# Patient Record
Sex: FEMALE | Race: WHITE | URBAN - METROPOLITAN AREA
[De-identification: names, ages, dates, MRNs, and addresses within clinical notes are randomized per-mention and may not be internally consistent; named-entity substitution may affect disease eponyms.]

---

## 2018-05-14 ENCOUNTER — APPOINTMENT (RX ONLY)
Dept: URBAN - METROPOLITAN AREA CLINIC 22 | Facility: CLINIC | Age: 33
Setting detail: DERMATOLOGY
End: 2018-05-14

## 2018-05-14 DIAGNOSIS — Z41.1 ENCOUNTER FOR COSMETIC SURGERY: ICD-10-CM

## 2018-05-14 PROBLEM — F41.9 ANXIETY DISORDER, UNSPECIFIED: Status: ACTIVE | Noted: 2018-05-14

## 2018-05-14 PROCEDURE — ? PHOTOS OBTAINED

## 2018-05-14 PROCEDURE — 99003: CPT

## 2018-05-14 PROCEDURE — ? CONSULTATION - RHINOPLASTY

## 2018-05-14 ASSESSMENT — LOCATION DETAILED DESCRIPTION DERM: LOCATION DETAILED: NASAL DORSUM

## 2018-05-14 ASSESSMENT — LOCATION SIMPLE DESCRIPTION DERM: LOCATION SIMPLE: NOSE

## 2018-05-14 ASSESSMENT — LOCATION ZONE DERM: LOCATION ZONE: NOSE

## 2018-05-14 NOTE — PROCEDURE: PHOTOS OBTAINED
Photographed Locations: Photos were obtained.
Follow-Up For Additional Photos?: no
Detail Level: Simple
Follow-Up Interval: week

## 2018-05-14 NOTE — HPI: NOSE (AESTHETIC DEFORMITY, NOSE)
Which Direction?: convex to the left side
How Severe Is It?: moderate
Is This A New Presentation, Or A Follow-Up?: Nasal Aesthetic Deformity
Additional History: Patient has history of septoplasty 12 years ago following a volleyball injury. Patient with concerns about the curvature of her nose. Patient denies breathing difficulty.

## 2018-05-22 ENCOUNTER — APPOINTMENT (RX ONLY)
Dept: URBAN - METROPOLITAN AREA CLINIC 22 | Facility: CLINIC | Age: 33
Setting detail: DERMATOLOGY
End: 2018-05-22

## 2018-05-22 DIAGNOSIS — Z41.1 ENCOUNTER FOR COSMETIC SURGERY: ICD-10-CM

## 2018-05-22 PROCEDURE — 99005: CPT

## 2018-05-22 PROCEDURE — ? CONSULTATION - RHINOPLASTY

## 2018-05-22 ASSESSMENT — LOCATION ZONE DERM: LOCATION ZONE: NOSE

## 2018-05-22 ASSESSMENT — LOCATION DETAILED DESCRIPTION DERM: LOCATION DETAILED: NASAL DORSUM

## 2018-05-22 ASSESSMENT — LOCATION SIMPLE DESCRIPTION DERM: LOCATION SIMPLE: NOSE

## 2018-06-11 ENCOUNTER — APPOINTMENT (RX ONLY)
Dept: URBAN - METROPOLITAN AREA CLINIC 22 | Facility: CLINIC | Age: 33
Setting detail: DERMATOLOGY
End: 2018-06-11

## 2018-06-11 DIAGNOSIS — Z41.9 ENCOUNTER FOR PROCEDURE FOR PURPOSES OTHER THAN REMEDYING HEALTH STATE, UNSPECIFIED: ICD-10-CM

## 2018-06-11 PROCEDURE — 99004: CPT

## 2018-06-11 PROCEDURE — ? PRE-OP WORKLIST

## 2018-06-11 PROCEDURE — ? PRESCRIPTION

## 2018-06-11 RX ORDER — DOXYCYCLINE HYCLATE 100 MG/1
TABLET, COATED ORAL
Qty: 14 | Refills: 0 | Status: ERX | COMMUNITY
Start: 2018-06-11

## 2018-06-11 RX ORDER — ONDANSETRON HYDROCHLORIDE 4 MG/1
TABLET, FILM COATED ORAL
Qty: 15 | Refills: 0 | Status: ERX | COMMUNITY
Start: 2018-06-11

## 2018-06-11 RX ORDER — METHYLPREDNISOLONE 4 MG/1
TABLET ORAL
Qty: 1 | Refills: 0 | Status: ERX | COMMUNITY
Start: 2018-06-11

## 2018-06-11 RX ORDER — OXYCODONE HYDROCHLORIDE AND ACETAMINOPHEN 5; 325 MG/1; MG/1
TABLET ORAL
Qty: 40 | Refills: 0 | COMMUNITY
Start: 2018-06-11

## 2018-06-11 RX ADMIN — DOXYCYCLINE HYCLATE: 100 TABLET, COATED ORAL at 15:09

## 2018-06-11 RX ADMIN — METHYLPREDNISOLONE: 4 TABLET ORAL at 15:09

## 2018-06-11 RX ADMIN — OXYCODONE HYDROCHLORIDE AND ACETAMINOPHEN: 5; 325 TABLET ORAL at 15:09

## 2018-06-11 RX ADMIN — ONDANSETRON HYDROCHLORIDE: 4 TABLET, FILM COATED ORAL at 15:09

## 2018-06-11 ASSESSMENT — LOCATION SIMPLE DESCRIPTION DERM: LOCATION SIMPLE: NOSE

## 2018-06-11 ASSESSMENT — LOCATION DETAILED DESCRIPTION DERM: LOCATION DETAILED: NASAL DORSUM

## 2018-06-11 ASSESSMENT — LOCATION ZONE DERM: LOCATION ZONE: NOSE

## 2018-06-11 NOTE — HPI: PREOPERATIVE APPOINTMENT (BRIEF)
Has The Patient Completed Informed Consent?: is scheduled to complete informed consent documentation during this visit
Has The Patient Fulfilled Financial Responsibilities For Surgical Scheduling?: is scheduled to complete payment to fulfill financial responsibilities during this visit
Have Arrangements And Instructions Been Made For Patient Transportation?: Transportation arrangements have been made
Date Of Procedure?: 06/18/2018
Facility: [facility]
Surgeon: Dr. Plaza

## 2018-06-11 NOTE — PROCEDURE: PRE-OP WORKLIST
Admission Status: outpatient
Date Of Surgery: 06/18/2018
Photos Taken?: yes
Surgeon: Dr. Plaza
Surgery Scheduled: rhinoplasty
Detail Level: Simple

## 2018-06-18 ENCOUNTER — APPOINTMENT (RX ONLY)
Dept: URBAN - METROPOLITAN AREA SURGERY CENTER 5 | Facility: SURGERY CENTER | Age: 33
Setting detail: DERMATOLOGY
End: 2018-06-18

## 2018-06-18 DIAGNOSIS — Z41.1 ENCOUNTER FOR COSMETIC SURGERY: ICD-10-CM

## 2018-06-18 PROCEDURE — ? SET GLOBAL PERIOD

## 2018-06-18 PROCEDURE — 30410 RECONSTRUCTION OF NOSE: CPT

## 2018-06-18 ASSESSMENT — LOCATION SIMPLE DESCRIPTION DERM: LOCATION SIMPLE: NOSE

## 2018-06-18 ASSESSMENT — LOCATION ZONE DERM: LOCATION ZONE: NOSE

## 2018-06-18 ASSESSMENT — LOCATION DETAILED DESCRIPTION DERM: LOCATION DETAILED: NASAL DORSUM

## 2018-06-18 NOTE — PROCEDURE: SET GLOBAL PERIOD
Other Surgery Performed: rhinoplasty
Detail Level: Detailed
Date Of Surgery (Mm/Dd/Yyyy): 06/18/2018
Surgery Global Period: 0

## 2018-06-19 ENCOUNTER — APPOINTMENT (RX ONLY)
Dept: URBAN - METROPOLITAN AREA CLINIC 22 | Facility: CLINIC | Age: 33
Setting detail: DERMATOLOGY
End: 2018-06-19

## 2018-06-19 DIAGNOSIS — Z48.89 ENCOUNTER FOR OTHER SPECIFIED SURGICAL AFTERCARE: ICD-10-CM

## 2018-06-19 PROCEDURE — 99024 POSTOP FOLLOW-UP VISIT: CPT

## 2018-06-19 PROCEDURE — ? CODE 99024 - NO CHARGE POST-OP VISIT

## 2018-06-19 PROCEDURE — ? COUNSELING - POST-OP CHECK, RHINOPLASTY

## 2018-06-19 ASSESSMENT — LOCATION DETAILED DESCRIPTION DERM: LOCATION DETAILED: NASAL DORSUM

## 2018-06-19 ASSESSMENT — LOCATION ZONE DERM: LOCATION ZONE: NOSE

## 2018-06-19 ASSESSMENT — LOCATION SIMPLE DESCRIPTION DERM: LOCATION SIMPLE: NOSE

## 2018-06-19 NOTE — PROCEDURE: REASSURANCE
Information Provided: The examination is within normal limits today. Routine precautions are given too the patient today.
Detail Level: Simple
Follow-Up Increment: 1
Follow-Up: yes
Follow-Up Interval: week

## 2018-06-25 ENCOUNTER — APPOINTMENT (RX ONLY)
Dept: URBAN - METROPOLITAN AREA CLINIC 22 | Facility: CLINIC | Age: 33
Setting detail: DERMATOLOGY
End: 2018-06-25

## 2018-06-25 DIAGNOSIS — Z48.89 ENCOUNTER FOR OTHER SPECIFIED SURGICAL AFTERCARE: ICD-10-CM

## 2018-06-25 PROCEDURE — 99024 POSTOP FOLLOW-UP VISIT: CPT

## 2018-06-25 PROCEDURE — ? COUNSELING - POST-OP CHECK, RHINOPLASTY

## 2018-06-25 PROCEDURE — ? SUTURE REMOVAL

## 2018-06-25 ASSESSMENT — LOCATION DETAILED DESCRIPTION DERM: LOCATION DETAILED: NASAL DORSUM

## 2018-06-25 ASSESSMENT — LOCATION SIMPLE DESCRIPTION DERM: LOCATION SIMPLE: NOSE

## 2018-06-25 ASSESSMENT — LOCATION ZONE DERM: LOCATION ZONE: NOSE

## 2018-06-25 NOTE — HPI: POST-OP CHECK, COSMETIC (EXTENDED)
What Best Describes The Level Of Symmetry? (Check All That Apply): good
How Severe Is Your Pain?: mild
How Is Your Wound Healing?: healing well
Compared To The Last Evaluation, How Have The Findings Changed?: stable
Date Of Procedure: 6.18.2018

## 2018-12-27 ENCOUNTER — APPOINTMENT (RX ONLY)
Dept: URBAN - METROPOLITAN AREA CLINIC 11 | Facility: CLINIC | Age: 33
Setting detail: DERMATOLOGY
End: 2018-12-27

## 2018-12-27 DIAGNOSIS — J34.3 HYPERTROPHY OF NASAL TURBINATES: ICD-10-CM

## 2018-12-27 DIAGNOSIS — Z48.89 ENCOUNTER FOR OTHER SPECIFIED SURGICAL AFTERCARE: ICD-10-CM

## 2018-12-27 DIAGNOSIS — J30.9 ALLERGIC RHINITIS, UNSPECIFIED: ICD-10-CM

## 2018-12-27 PROCEDURE — ? PHOTOS OBTAINED

## 2018-12-27 PROCEDURE — ? CODE 99024 - NO CHARGE POST-OP VISIT

## 2018-12-27 PROCEDURE — ? COUNSELING - HYPERTROPHY OF NASAL TURBINATES

## 2018-12-27 PROCEDURE — ? COUNSELING - POST-OP CHECK, RHINOPLASTY

## 2018-12-27 PROCEDURE — 99024 POSTOP FOLLOW-UP VISIT: CPT

## 2018-12-27 PROCEDURE — ? COUNSELING - RHINITIS

## 2018-12-27 ASSESSMENT — LOCATION DETAILED DESCRIPTION DERM
LOCATION DETAILED: NASAL DORSUM
LOCATION DETAILED: LEFT HEAD OF INFERIOR TURBINATE

## 2018-12-27 ASSESSMENT — LOCATION SIMPLE DESCRIPTION DERM
LOCATION SIMPLE: NOSE
LOCATION SIMPLE: LEFT INFERIOR TURBINATE

## 2018-12-27 ASSESSMENT — LOCATION ZONE DERM
LOCATION ZONE: NOSE
LOCATION ZONE: NASAL_CAVITY

## 2018-12-27 NOTE — PROCEDURE: PHOTOS OBTAINED
Photographed Locations: Photos were obtained. consent for intranet use obtained
Follow-Up Increment: 1
Detail Level: Simple
Follow-Up For Additional Photos?: no
Follow-Up Interval: week

## 2018-12-27 NOTE — HPI: POST-OP CHECK, COSMETIC (EXTENDED)
What Best Describes The Level Of Symmetry? (Check All That Apply): good
How Severe Is Your Pain?: 0 out of 10
How Is Your Wound Healing?: healed
Date Of Procedure: 06/18/2018

## 2018-12-27 NOTE — PROCEDURE: REASSURANCE
Follow-Up Interval: months
Follow-Up: yes
Follow-Up Increment: 2
Detail Level: Simple
Information Provided: The examination is within normal limits today. Routine precautions are given too the patient today.

## 2019-04-10 ENCOUNTER — APPOINTMENT (RX ONLY)
Dept: URBAN - METROPOLITAN AREA CLINIC 11 | Facility: CLINIC | Age: 34
Setting detail: DERMATOLOGY
End: 2019-04-10

## 2019-04-10 DIAGNOSIS — Z48.89 ENCOUNTER FOR OTHER SPECIFIED SURGICAL AFTERCARE: ICD-10-CM

## 2019-04-10 DIAGNOSIS — J30.9 ALLERGIC RHINITIS, UNSPECIFIED: ICD-10-CM

## 2019-04-10 PROCEDURE — 99024 POSTOP FOLLOW-UP VISIT: CPT

## 2019-04-10 PROCEDURE — ? PATIENT SPECIFIC COUNSELING

## 2019-04-10 PROCEDURE — ? COUNSELING - RHINITIS

## 2019-04-10 PROCEDURE — ? CODE 99024 - NO CHARGE POST-OP VISIT

## 2019-04-10 ASSESSMENT — LOCATION SIMPLE DESCRIPTION DERM
LOCATION SIMPLE: LEFT INFERIOR TURBINATE
LOCATION SIMPLE: NOSE
LOCATION SIMPLE: RIGHT INFERIOR TURBINATE

## 2019-04-10 ASSESSMENT — LOCATION ZONE DERM
LOCATION ZONE: NOSE
LOCATION ZONE: NASAL_CAVITY

## 2019-04-10 ASSESSMENT — LOCATION DETAILED DESCRIPTION DERM
LOCATION DETAILED: NASAL DORSUM
LOCATION DETAILED: LEFT HEAD OF INFERIOR TURBINATE
LOCATION DETAILED: RIGHT HEAD OF INFERIOR TURBINATE

## 2019-04-10 NOTE — PROCEDURE: REASSURANCE
Follow-Up Increment: 3
Information Provided: The examination is within normal limits today. Routine precautions are given too the patient today.
Follow-Up Interval: as needed
Detail Level: Simple
Follow-Up: yes
Stable

## 2019-04-10 NOTE — HPI: POST-OP CHECK, COSMETIC RHINOPLASTY (EXTENDED)
What Best Describes The Level Of Symmetry? (Check All That Apply): good
How Is Your Wound Healing?: healing well
Date Of Procedure: 06/18/2018

## 2019-04-10 NOTE — PROCEDURE: PATIENT SPECIFIC COUNSELING
Detail Level: Detailed
Other (Free Text): small injection of Juvederm voluma recomended at right lateral nasal wall no charge patient to schedule

## 2019-04-10 NOTE — HPI: NOSE (RHINORRHEA)
How Severe Are Your Symptoms?: mild
How Quickly Did The Rhinorrhea Develop?: gradually
Is This A New Presentation, Or A Follow-Up?: Rhinorrhea

## 2019-04-26 ENCOUNTER — APPOINTMENT (RX ONLY)
Dept: URBAN - METROPOLITAN AREA CLINIC 11 | Facility: CLINIC | Age: 34
Setting detail: DERMATOLOGY
End: 2019-04-26

## 2019-04-26 DIAGNOSIS — Z41.9 ENCOUNTER FOR PROCEDURE FOR PURPOSES OTHER THAN REMEDYING HEALTH STATE, UNSPECIFIED: ICD-10-CM

## 2019-04-26 PROCEDURE — ? FILLERS

## 2019-04-26 PROCEDURE — 99024 POSTOP FOLLOW-UP VISIT: CPT

## 2019-04-26 PROCEDURE — ? PHOTOS OBTAINED

## 2019-04-26 ASSESSMENT — LOCATION DETAILED DESCRIPTION DERM
LOCATION DETAILED: RIGHT NASAL ALA
LOCATION DETAILED: RIGHT NASAL SIDEWALL

## 2019-04-26 ASSESSMENT — LOCATION ZONE DERM: LOCATION ZONE: NOSE

## 2019-04-26 ASSESSMENT — LOCATION SIMPLE DESCRIPTION DERM: LOCATION SIMPLE: RIGHT NOSE

## 2019-04-26 NOTE — PROCEDURE: FILLERS
Additional Area 5 Volume In Cc: 0
Filler: Juvederm Voluma XC
Topical Anesthesia?: yes
Topical Anesthetic #2: prilocaine
Use Map Statement For Sites (Optional): No
Topical Anesthetic #1: lidocaine
Topical Anesthetic Base: ointment
Administered By (Optional): Dr. Plaza
Topical Anesthetic #3: phenenylephrine
Additional Area 1 Volume In Cc: 0.2
Consent: Written consent obtained. Risks include but not limited to bruising, beading, irregular texture, ulceration, infection, allergic reaction, scar formation, incomplete augmentation, temporary nature, procedural pain.
Detail Level: Simple
Lot #: [id number]
Additional Area 1 Location: right nasal sidewall
Expiration Date (Month Year): 2020-02-12

## 2019-04-26 NOTE — PROCEDURE: PHOTOS OBTAINED
Photographed Locations: Photos were obtained. consent for intranet use obtained
Follow-Up Increment: 1
Detail Level: Simple
Follow-Up Interval: week
Follow-Up For Additional Photos?: no

## 2019-05-01 ENCOUNTER — APPOINTMENT (RX ONLY)
Dept: URBAN - METROPOLITAN AREA CLINIC 11 | Facility: CLINIC | Age: 34
Setting detail: DERMATOLOGY
End: 2019-05-01

## 2019-05-01 DIAGNOSIS — Z428 OTHER AFTERCARE INVOLVING THE USE OF PLASTIC SURGERY: ICD-10-CM

## 2019-05-01 PROCEDURE — ? HYALURONIDASE INJECTION (OLD)

## 2019-05-01 PROCEDURE — 99024 POSTOP FOLLOW-UP VISIT: CPT

## 2019-05-01 ASSESSMENT — LOCATION SIMPLE DESCRIPTION DERM: LOCATION SIMPLE: NOSE

## 2019-05-01 ASSESSMENT — LOCATION DETAILED DESCRIPTION DERM: LOCATION DETAILED: NASAL DORSUM

## 2019-05-01 ASSESSMENT — LOCATION ZONE DERM: LOCATION ZONE: NOSE

## 2019-05-01 NOTE — PROCEDURE: HYALURONIDASE INJECTION (OLD)
Consent: The risks of atrophy were reviewed with the patient.
Administered By (Optional): Dr. Plaza
Hyaluronidase Preparation: hyaluronidase
Detail Level: Simple
Lot # (Optional): [id number]
Total Volume (Ccs): 0.1
Expiration Date (Optional): 5/2020

## 2019-05-29 ENCOUNTER — APPOINTMENT (RX ONLY)
Dept: URBAN - METROPOLITAN AREA CLINIC 11 | Facility: CLINIC | Age: 34
Setting detail: DERMATOLOGY
End: 2019-05-29

## 2019-05-29 DIAGNOSIS — Z41.1 ENCOUNTER FOR COSMETIC SURGERY: ICD-10-CM

## 2019-05-29 DIAGNOSIS — Z428 OTHER AFTERCARE INVOLVING THE USE OF PLASTIC SURGERY: ICD-10-CM

## 2019-05-29 PROCEDURE — ? CONSULTATION - RHINOPLASTY

## 2019-05-29 PROCEDURE — ? HYALURONIDASE INJECTION (OLD)

## 2019-05-29 PROCEDURE — 99024 POSTOP FOLLOW-UP VISIT: CPT

## 2019-05-29 ASSESSMENT — LOCATION DETAILED DESCRIPTION DERM: LOCATION DETAILED: NASAL DORSUM

## 2019-05-29 ASSESSMENT — LOCATION SIMPLE DESCRIPTION DERM: LOCATION SIMPLE: NOSE

## 2019-05-29 ASSESSMENT — LOCATION ZONE DERM: LOCATION ZONE: NOSE

## 2019-05-29 NOTE — PROCEDURE: HYALURONIDASE INJECTION (OLD)
Detail Level: Simple
Lot # (Optional): [id number]
Expiration Date (Optional): 10/2016
Total Volume (Ccs): 0.7
Consent: The risks of atrophy were reviewed with the patient.
Concentration (Mg/Ml): 3
Administered By (Optional): Dr. Plaza
Hyaluronidase Preparation: hyaluronidase